# Patient Record
Sex: FEMALE | Race: WHITE | NOT HISPANIC OR LATINO | URBAN - METROPOLITAN AREA
[De-identification: names, ages, dates, MRNs, and addresses within clinical notes are randomized per-mention and may not be internally consistent; named-entity substitution may affect disease eponyms.]

---

## 2024-03-01 ENCOUNTER — APPOINTMENT (OUTPATIENT)
Dept: PHYSICAL THERAPY | Facility: CLINIC | Age: 34
End: 2024-03-01
Payer: COMMERCIAL

## 2024-03-12 ENCOUNTER — TREATMENT (OUTPATIENT)
Dept: PHYSICAL THERAPY | Facility: CLINIC | Age: 34
End: 2024-03-12
Payer: COMMERCIAL

## 2024-03-12 DIAGNOSIS — N81.89 OTHER FEMALE GENITAL PROLAPSE: ICD-10-CM

## 2024-03-12 PROCEDURE — 97162 PT EVAL MOD COMPLEX 30 MIN: CPT | Mod: GP | Performed by: PHYSICAL THERAPIST

## 2024-03-12 PROCEDURE — 97112 NEUROMUSCULAR REEDUCATION: CPT | Mod: GP | Performed by: PHYSICAL THERAPIST

## 2024-03-12 PROCEDURE — 97110 THERAPEUTIC EXERCISES: CPT | Mod: GP | Performed by: PHYSICAL THERAPIST

## 2024-03-12 ASSESSMENT — PAIN - FUNCTIONAL ASSESSMENT: PAIN_FUNCTIONAL_ASSESSMENT: 0-10

## 2024-03-12 ASSESSMENT — PAIN SCALES - GENERAL: PAINLEVEL_OUTOF10: 4

## 2024-03-12 NOTE — PROGRESS NOTES
"               Physical Therapy Pelvic Floor Evaluation    Patient Name: Stephanie Hall  MRN: [id number]  Today's Date: 3/13/2024  Time Calculation  Start Time: 1705  Stop Time: 1805  Time Calculation (min): 60 min  PT Therapeutic Procedures Time Entry  Neuromuscular Re-Education Time Entry: 10  Therapeutic Exercise Time Entry: 15     Moderate complexity due to patient's clinical presentation being evolving with changing characteristics, with comorbidities all of which may negatively impact rehab tolerance and progression.      Current Problem:   1. Other female genital prolapse  Referral to Physical Therapy    Follow Up In Physical Therapy          Subjective    Precautions:  Precautions  Precautions Comment: no precautions -- 8 weeks post delivery  Pain:  Pain Assessment  Pain Assessment: 0-10  Pain Score: 4 (pain varies 0-4)    PELVIC HISTORY:  Chief Complaint/Description of Symptoms:   3 pregnancies in 4 years.  Last pregnancy in January.  Last pregnancy was very difficult -- first degree tear, but first pregnancy had a  \"pretty big tear.\";  @ 8 weak appointment had mild prolapse; sometimes feels prolapse;  Reporting that her back, pelvic area, and hips throb during an active day.  Admits a little incontinece -- feels like a little trickle with walking but isn't consistent.  LBP isn't constant ; + breast feeding;  Reports she felt pelvic pain throughout pregnancy and attended 2 PT sessions without benefit.      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,    Patient Primary Goal:   To prevent prolapse from worsening and just feel better    PELVIC PAIN:     Location: back hips, pubis aching discomfort  Description: discomfort -- 4/10, sometimes 0/10 first thing in moring  Aggravating factors: prolonged walking or ADLs  Mitigating factors:Laying down   Pain with intercourse Deep penetration -- discomfort, The entire next day though-- throbbing in addcutors    MENSTRUAL HISTORY:  Not on " menstrual cycle yet:    OB HISTORY:  3 Vaginal deliveries  BLADDER:     Daytime Voiding Frequency: doesn't think she is going too often but thinks every 2 hours  Nighttime Voiding Frequency:  2 times a 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   Excessive Urinary Urgency: no  Unintentional urine loss: yes   Leakage occurs with: walking or activity   Leakage amount: small amount   Slow/weak urine stream: normal  Difficulty initiating urine stream/push to urinate: NO  Able to completely empty bladder: did feel a few times not emptying bladder -- occasional    BOWEL:     Denies bowel symptoms but admits to occasional constipation    FLUID/DIET INTAKE:  Overall good diet and 48-64 ounces of water per day    EXERCISE:  Current exercise regime:   A little walking,     Objective   POSTURE/ALIGNMENT:  Left anterior rotation, + pubic shear more pain on left than right, increase in lumbar lordosis     ROM:  Lumbar ROM Range   Flexion 75%   Extension 75%     Hip AROM L R   Active HIP ROM -- all planes WNL WNL      MMT:  Hip MMT L R   Hip Flexion 4/5 4/5   Hip Extension 3-/5 3-/5   Hip Abduction 4-/5 4-/5   External Rotation 4-/5 4-/5   Internal Rotation 4-/5 4-/5      TA: fair minus TA activation, poor load transfer with active SLR, tends to breath with upper chest instead of diaphragmatic/360 breathing    FLEXIBILITY R/L/and EXTERNAL MUSCLE PALPATION:  Hamstrings: tight/tight  Bilateral tight adductors especially proximal -- very tender to palpation  Tight piriformis bilaterally     PELVIC FLOOR  Patient Position:  hooklying  EXTERNAL OBSERVATION:  Voluntary Contraction: able to contract   Voluntary Relaxation: able to relax   Vulvar Assessment: health tissue observed    EXTERNAL PALPATION:  Bulbo: soreness bilateral   Ischiocavernosus: soreness bilateral   Transverse perineum: tender left     INTERNAL VAGINAL EXAMINATION WITH PATIENT CONSENT:  Internal:  Superficial layer mm   Mild tenderness at vaginal entrance bilaterally    Deep  layer:  Tender right >Left OI and ilio and pubococcygeus.  Patient felt radiating symptoms with palpation of this muscles to back and hip area  Tenderness R>L, facial expression--grimaces throughout    Strength  PF contraction 2/5, with exhale 2+/5; challenged with hold pattern    Prolapse  + cystocele and rectocele grade 1-2 noted in hooklying may be more in standing or sitting with gravity    Outcome Measures:  PFIQ-7    Treatments:  Neuro-reeducation:    Educated on diaphragmatic breathing and relation to pelvic floor  Advised to exhale on return from squatted positions when picks up baby and cleans up toys from house  Discussed importance of drinking of water for constipation and avoiding straining with bowel movements with relation to prolapse.  Therapeutic exercise:    Kegel with breathing and proper technique for kegel     OP EDUCATION:  Outpatient Education  Individual(s) Educated: Patient  Education Provided: Anatomy, Home Exercise Program, Physiology, POC  Risk and Benefits Discussed with Patient/Caregiver/Other: yes  Patient/Caregiver Demonstrated Understanding: yes  Plan of Care Discussed and Agreed Upon: yes  Patient Response to Education: Patient/Caregiver Verbalized Understanding of Information, Patient/Caregiver Performed Return Demonstration of Exercises/Activities, Patient/Caregiver Asked Appropriate Questions    Assessment     PT Assessment Results: Decreased strength, Decreased coordination, Pain, Decreased range of motion  Rehab Prognosis: Good  Evaluation/Treatment Tolerance: Patient tolerated treatment well    Pt is a 33 y.o. female who presents post 3rd vaginal delivery with impairments of weakness, pain, and tightness. These impairments have led to functional limitations including pain with intercourse, pain with ADLs, mild urinary incontinence. Pt would benefit from skilled physical therapy intervention to improve above impairments and facilitate return to  function.    Plan:  Treatment/Interventions: Dry needling, Education/ Instruction, Electrical stimulation, Manual therapy, Neuromuscular re-education, Therapeutic activities, Therapeutic exercises, Taping techniques, Ultrasound, Biofeedback  PT Plan: Skilled PT  PT Frequency: 1 time per week  Duration: 12 weeks  Number of Treatments Authorized: 10 sessions per POC, Medical necessity per insurance  Rehab Potential: Good  Plan of Care Agreement: Patient  Next session:  correct alignment, consider SI belt;  roller for adductors, bridging on slant    Goals:  Active       PT Problem       STGs       Start:  03/12/24    Expected End:  05/11/24       1)  Patient will be knowledgeable with regards to downtraining techniques to improve relaxation of pelvic floor muscles  2)  Patient will report 25% less pain/symptoms during walking  3)  Patient will perform diaphragmatic breathing properly to relax and contract pelvic floor muscle appropriately  4)  Patient will demonstrate good transverse abdominis activation to stabilize lumbopelvic girdle during ADLs                    LTGs       Start:  03/12/24    Expected End:  07/10/24       1)  Patient will increase tolerance to internal penetration for examination with min to nil pain  2)  Patient will reduce urine loss to nil during ADLs  3)  Patient will be able to return to prior level of function without feeling prolapse  4)  Patient will improve pelvic floor contraction to by 1/2-1 MMT  with coordinated exhale for improved continence   5)  Patient will report no pain in back and legs post ADLs, intercourse and walking           Patient Stated Goal 1       Start:  03/12/24    Expected End:  07/10/24       Patient wants to increase pelvic floor strength to improve prolapse

## 2024-03-27 ENCOUNTER — APPOINTMENT (OUTPATIENT)
Dept: PHYSICAL THERAPY | Facility: CLINIC | Age: 34
End: 2024-03-27
Payer: COMMERCIAL

## 2024-04-05 ENCOUNTER — TREATMENT (OUTPATIENT)
Dept: PHYSICAL THERAPY | Facility: CLINIC | Age: 34
End: 2024-04-05
Payer: COMMERCIAL

## 2024-04-05 DIAGNOSIS — N81.89 OTHER FEMALE GENITAL PROLAPSE: ICD-10-CM

## 2024-04-05 PROCEDURE — 97110 THERAPEUTIC EXERCISES: CPT | Mod: GP | Performed by: PHYSICAL THERAPIST

## 2024-04-05 PROCEDURE — 97140 MANUAL THERAPY 1/> REGIONS: CPT | Mod: GP | Performed by: PHYSICAL THERAPIST

## 2024-04-05 ASSESSMENT — PAIN DESCRIPTION - DESCRIPTORS: DESCRIPTORS: SORE

## 2024-04-05 ASSESSMENT — PAIN SCALES - GENERAL: PAINLEVEL_OUTOF10: 3

## 2024-04-05 ASSESSMENT — PAIN - FUNCTIONAL ASSESSMENT: PAIN_FUNCTIONAL_ASSESSMENT: 0-10

## 2024-04-05 NOTE — PROGRESS NOTES
Physical Therapy Treatment    Patient Name: Stephanie Hall  MRN: [id number]  Encounter date:  2024  Time Calculation  Start Time: 1310  Stop Time: 1405  Time Calculation (min): 55 min     PT Therapeutic Procedures Time Entry  Manual Therapy Time Entry: 18  Therapeutic Exercise Time Entry: 35    Visit Number:  2 (including evaluation)  Planned total visits: 10 per POC  Visits Authorized/Insurance Coverage:  [employer] [employer], 80/20 [place], [place], NO AUTH, [employer] # [id number]    Current Problem  1. Other female genital prolapse  Follow Up In Physical Therapy        Precautions  Precautions  Precautions Comment: no precautions -- vaginal delivery 24  Post  24    Pain  Pain Assessment: 0-10  Pain Score: 3  Pain Descriptors: Sore  Still has been having all over pains especially by end of night.    Subjective  General  Reason for Referral: prolapse  Referred By: Dr. Stokes     Overall feels weak all over, but no increase in symptoms since last session.      Objective  Fair (-) TA activation, left anterior rotation    Treatment:  Manual:    MET to correction pelvic alignment  Therapeutic exercise:    Isometric hip adduction with pillow or ball x 5   Isometric hip flexion (R) and hip extension left x 5   Hip abduction blue x 10 to tension  TA activation  TA activation with UE flexion x 10   Side pull with blue band x 10   (Cues for proper breathing)     Therapeutic activity:   Discussed proper breathing mechanics with ADLs and exhaling on exertion, how to put car seat in car,  baby and toys     Current HEP:  Breathing and kegel  Access Code: [email or web address]  URL: [email or web address]  Date: 2024  Prepared by: Gisel Ding    Exercises  - Supine Hip adduction isometric with Ball -- Exercise 1   - 1 x daily - 7 x weekly - 1 sets - 5 reps - 5 hold  - Hooklying Clamshell with Resistance -- Exercise 4   - 1 x daily - 7 x weekly - 1 sets - 5-10 reps  - Hooklying SI Joint  Self-Correction  - 1 x daily - 7 x weekly - 3 sets - 10 reps  - Supine Bridge  - 1 x daily - 7 x weekly - 1 sets - 10 reps  - Hooklying Transversus Abdominis Palpation  - 1 x daily - 7 x weekly - 1 sets - 10 reps  - Dead Bug Alternating Arm Extension  - 1 x daily - 7 x weekly - 1 sets - 10 reps    OP EDUCATION:  Outpatient Education  Individual(s) Educated: Patient  Education Provided: Anatomy, Home Exercise Program, Physiology, POC  Risk and Benefits Discussed with Patient/Caregiver/Other: yes  Patient/Caregiver Demonstrated Understanding: yes  Plan of Care Discussed and Agreed Upon: yes  Patient Response to Education: Patient/Caregiver Verbalized Understanding of Information, Patient/Caregiver Performed Return Demonstration of Exercises/Activities, Patient/Caregiver Asked Appropriate Questions    Assessment:  PT Assessment  PT Assessment Results: Decreased strength, Decreased coordination, Pain, Decreased range of motion  Rehab Prognosis: Good  Evaluation/Treatment Tolerance: Patient tolerated treatment well  Pt's response to treatment:  Able to align pelvis with MET.  Patient challenged with core activation.  Patient advised to perform exercises on slant to allow for gravity to assist in prolapse reduction.  Patient continues to benefit from skilled PT to strengthen and progress towards established physical therapy goals.     Pain end of session: muscle soreness    Plan:  OP PT Plan  Treatment/Interventions: Dry needling, Education/ Instruction, Electrical stimulation, Manual therapy, Neuromuscular re-education, Therapeutic activities, Therapeutic exercises, Taping techniques, Ultrasound, Biofeedback  PT Plan: Skilled PT  PT Frequency: 1 time per week  Duration: 12 weeks  Number of Treatments Authorized: 10 sessions per POC, Medical necessity per insurance  Rehab Potential: Good  Plan of Care Agreement: Patient  Continue with current POC/no changes    Assessment of current progress against goals:  Progressing  toward functional goals    Goals:  Active       PT Problem       STGs       Start:  03/12/24    Expected End:  05/11/24       1)  Patient will be knowledgeable with regards to downtraining techniques to improve relaxation of pelvic floor muscles  2)  Patient will report 25% less pain/symptoms during walking  3)  Patient will perform diaphragmatic breathing properly to relax and contract pelvic floor muscle appropriately  4)  Patient will demonstrate good transverse abdominis activation to stabilize lumbopelvic girdle during ADLs                    LTGs       Start:  03/12/24    Expected End:  07/10/24       1)  Patient will increase tolerance to internal penetration for examination with min to nil pain  2)  Patient will reduce urine loss to nil during ADLs  3)  Patient will be able to return to prior level of function without feeling prolapse  4)  Patient will improve pelvic floor contraction to by 1/2-1 MMT  with coordinated exhale for improved continence   5)  Patient will report no pain in back and legs post ADLs, intercourse and walking           Patient Stated Goal 1       Start:  03/12/24    Expected End:  07/10/24       Patient wants to increase pelvic floor strength to improve prolapse

## 2024-04-09 ENCOUNTER — APPOINTMENT (OUTPATIENT)
Dept: PHYSICAL THERAPY | Facility: CLINIC | Age: 34
End: 2024-04-09
Payer: COMMERCIAL

## 2024-04-11 ENCOUNTER — APPOINTMENT (OUTPATIENT)
Dept: PHYSICAL THERAPY | Facility: CLINIC | Age: 34
End: 2024-04-11
Payer: COMMERCIAL

## 2024-04-16 ENCOUNTER — APPOINTMENT (OUTPATIENT)
Dept: PHYSICAL THERAPY | Facility: CLINIC | Age: 34
End: 2024-04-16
Payer: COMMERCIAL

## 2024-04-18 ENCOUNTER — APPOINTMENT (OUTPATIENT)
Dept: PHYSICAL THERAPY | Facility: CLINIC | Age: 34
End: 2024-04-18
Payer: COMMERCIAL

## 2024-04-23 ENCOUNTER — TREATMENT (OUTPATIENT)
Dept: PHYSICAL THERAPY | Facility: CLINIC | Age: 34
End: 2024-04-23
Payer: COMMERCIAL

## 2024-04-23 DIAGNOSIS — N81.89 OTHER FEMALE GENITAL PROLAPSE: ICD-10-CM

## 2024-04-23 PROCEDURE — 97530 THERAPEUTIC ACTIVITIES: CPT | Mod: GP | Performed by: PHYSICAL THERAPIST

## 2024-04-23 PROCEDURE — 97112 NEUROMUSCULAR REEDUCATION: CPT | Mod: GP | Performed by: PHYSICAL THERAPIST

## 2024-04-23 PROCEDURE — 97110 THERAPEUTIC EXERCISES: CPT | Mod: GP | Performed by: PHYSICAL THERAPIST

## 2024-04-23 PROCEDURE — 97140 MANUAL THERAPY 1/> REGIONS: CPT | Mod: GP | Performed by: PHYSICAL THERAPIST

## 2024-04-23 NOTE — PROGRESS NOTES
Physical Therapy Treatment    Patient Name: Stephanie Hall  MRN: [id number]  Encounter date:  4/23/2024  Time Calculation  Start Time: 1700  Stop Time: 1800  Time Calculation (min): 60 min     PT Therapeutic Procedures Time Entry  Manual Therapy Time Entry: 20  Neuromuscular Re-Education Time Entry: 10  Therapeutic Exercise Time Entry: 20  Therapeutic Activity Time Entry: 10    Visit Number:  3 (including evaluation)  Planned total visits: 10 per POC  Visits Authorized/Insurance Coverage:  [employer] [employer], 80/20 [place], [place], NO AUTH, [employer] # [id number]    Current Problem  Problem List Items Addressed This Visit             ICD-10-CM    Other female genital prolapse N81.89         Precautions  Precautions  Precautions Comment: no precautions -- vaginal delivery 1/14/24    Pain     Back is hurting 3-4/10.  (R) side lower  Subjective  General  Reason for Referral: prolapse  Referred By: Dr. Stokes     Reports she felt something hard protruding from vagina -- felt like bone.  Hasn't done many exercises because family has been ill.      Objective  Enlarged glands vaginal entrance.  Firm but not painful for patient;  tender lumbar paraspinals and quadratus R>L;   Tender L>R iliococcygeus, pubococcygeus and obturator internus    Treatment:  Manual:  Verbal consent to vaginal examination and treatment  Sweeping to vaginal entrance bilateral, STM to pelvic floor muscles bilaterally L>R with trigger point release  In prone over pillow:    STM to lumbar paraspinals with free up  Therapeutic exercise  Neuro Re-education:    Reviewed proper pelvic floor contraction with coordination of breathing.  Patient needed cues for breathing with exercises for for strengthening and stretching;    Therapeutic exercise:    Discontinued bridging as this increased back pain  Clamshells x 10 L1 bilateral  PPT x 10   Hip adduction with pillow x 10   Stretching red ball but showed modification for no ball at  home  Piriformis stretching 10 sec x 3 bilateral   DKTC x 5   Happy baby with ball support 10 sec x 5   Therapeutic activity:    Discussed proper lifting mechanics with 10# medicine ball to simulate car seat lift and carry, stressed avoid twisting will placing car seat in car or removing.       Current HEP: (same access code is updated)  Access Code: [email or web address]  URL: [email or web address]  Date: 04/23/2024  Prepared by: Gisel Ding    Exercises  - Supine Hip adduction isometric with Ball -- Exercise 1   - 1 x daily - 7 x weekly - 1 sets - 5 reps - 5 hold  - Hooklying Clamshell with Resistance -- Exercise 4   - 1 x daily - 7 x weekly - 1 sets - 5-10 reps  - Hooklying SI Joint Self-Correction  - 1 x daily - 7 x weekly - 3 sets - 10 reps  - Supine Bridge  - 1 x daily - 7 x weekly - 1 sets - 10 reps  - Hooklying Transversus Abdominis Palpation  - 1 x daily - 7 x weekly - 1 sets - 10 reps  - Dead Bug Alternating Arm Extension  - 1 x daily - 7 x weekly - 1 sets - 10 reps  - Clamshell  - 1 x daily - 7 x weekly - 1 sets - 10 reps  - Supine Posterior Pelvic Tilt  - 1 x daily - 7 x weekly - 1 sets - 10 reps  - Supine Pelvic Floor Stretch  - 1 x daily - 7 x weekly - 1 sets - 5 reps - 10 sec hold  - Supine Piriformis Stretch  - 1 x daily - 7 x weekly - 1 sets - 3 reps - 10sec hold  - Supine Hip and Knee Flexion AROM with Swiss Ball  - 1 x daily - 7 x weekly - 1 sets - 10 reps    OP EDUCATION:  Outpatient Education  Individual(s) Educated: Patient  Education Provided: Anatomy, Home Exercise Program, Physiology, POC  Risk and Benefits Discussed with Patient/Caregiver/Other: yes  Patient/Caregiver Demonstrated Understanding: yes  Plan of Care Discussed and Agreed Upon: yes  Patient Response to Education: Patient/Caregiver Verbalized Understanding of Information, Patient/Caregiver Performed Return Demonstration of Exercises/Activities, Patient/Caregiver Asked Appropriate Questions    Assessment:  PT Assessment  PT  Assessment Results: Decreased strength, Decreased coordination, Pain, Decreased range of motion  Rehab Prognosis: Good  Pt's response to treatment:  No palpable hard structure from vagina this date.  Questionable if patient felt firm gland.  Patient will continue to monitor.  Patient with poor transverse abdominis activation causing spinal compensation during body mechanics while carrying car seat etc.  Patient had less pain after session and improved coordination of breathing and understanding of body mechanics    Pain end of session: 1-2/10    Plan:  OP PT Plan  Treatment/Interventions: Dry needling, Education/ Instruction, Electrical stimulation, Manual therapy, Neuromuscular re-education, Therapeutic activities, Therapeutic exercises, Taping techniques, Ultrasound, Biofeedback  PT Plan: Skilled PT  PT Frequency: 1 time per week  Duration: 12 weeks  Number of Treatments Authorized: 10 sessions per POC, Medical necessity per insurance  Rehab Potential: Good  Plan of Care Agreement: Patient  Continue with current POC/no changes    Assessment of current progress against goals:  Progressing toward functional goals    Goals:  Active       PT Problem       STGs       Start:  03/12/24    Expected End:  05/11/24       1)  Patient will be knowledgeable with regards to downtraining techniques to improve relaxation of pelvic floor muscles  2)  Patient will report 25% less pain/symptoms during walking  3)  Patient will perform diaphragmatic breathing properly to relax and contract pelvic floor muscle appropriately  4)  Patient will demonstrate good transverse abdominis activation to stabilize lumbopelvic girdle during ADLs                    LTGs       Start:  03/12/24    Expected End:  07/10/24       1)  Patient will increase tolerance to internal penetration for examination with min to nil pain  2)  Patient will reduce urine loss to nil during ADLs  3)  Patient will be able to return to prior level of function without  feeling prolapse  4)  Patient will improve pelvic floor contraction to by 1/2-1 MMT  with coordinated exhale for improved continence   5)  Patient will report no pain in back and legs post ADLs, intercourse and walking           Patient Stated Goal 1       Start:  03/12/24    Expected End:  07/10/24       Patient wants to increase pelvic floor strength to improve prolapse

## 2024-04-26 ENCOUNTER — OFFICE VISIT (OUTPATIENT)
Dept: PRIMARY CARE | Facility: CLINIC | Age: 34
End: 2024-04-26
Payer: COMMERCIAL

## 2024-04-26 ENCOUNTER — LAB (OUTPATIENT)
Dept: LAB | Facility: LAB | Age: 34
End: 2024-04-26
Payer: COMMERCIAL

## 2024-04-26 VITALS
BODY MASS INDEX: 22.16 KG/M2 | SYSTOLIC BLOOD PRESSURE: 102 MMHG | WEIGHT: 133 LBS | HEIGHT: 65 IN | DIASTOLIC BLOOD PRESSURE: 54 MMHG

## 2024-04-26 DIAGNOSIS — H57.13 PAIN OF BOTH EYES: ICD-10-CM

## 2024-04-26 DIAGNOSIS — M25.50 ARTHRALGIA, UNSPECIFIED JOINT: ICD-10-CM

## 2024-04-26 DIAGNOSIS — M79.10 MYALGIA: ICD-10-CM

## 2024-04-26 DIAGNOSIS — R14.0 ABDOMINAL BLOATING: Primary | ICD-10-CM

## 2024-04-26 PROBLEM — J06.9 RECURRENT UPPER RESPIRATORY TRACT INFECTION: Status: ACTIVE | Noted: 2024-04-26

## 2024-04-26 PROBLEM — D72.9 NEUTROPHILIA: Status: ACTIVE | Noted: 2024-04-26

## 2024-04-26 PROBLEM — O46.91: Status: ACTIVE | Noted: 2023-07-14

## 2024-04-26 PROBLEM — D70.4 CYCLIC NEUTROPENIA (MULTI): Status: ACTIVE | Noted: 2024-04-26

## 2024-04-26 PROBLEM — O09.299: Status: ACTIVE | Noted: 2024-01-04

## 2024-04-26 PROBLEM — D89.9: Status: ACTIVE | Noted: 2024-04-26

## 2024-04-26 PROBLEM — D72.19 OTHER EOSINOPHILIA: Status: ACTIVE | Noted: 2024-04-26

## 2024-04-26 PROBLEM — R76.8 RED BLOOD CELL ANTIBODY POSITIVE: Status: ACTIVE | Noted: 2024-01-16

## 2024-04-26 PROBLEM — J32.9 RECURRENT SINUSITIS: Status: ACTIVE | Noted: 2024-04-26

## 2024-04-26 PROBLEM — I89.9 LYMPH NODE DISORDER: Status: ACTIVE | Noted: 2024-04-26

## 2024-04-26 PROBLEM — J35.1 CHRONIC TONSILLAR HYPERTROPHY: Status: ACTIVE | Noted: 2024-04-26

## 2024-04-26 PROBLEM — O99.019 ANTEPARTUM ANEMIA (HHS-HCC): Status: ACTIVE | Noted: 2024-01-15

## 2024-04-26 PROCEDURE — 86431 RHEUMATOID FACTOR QUANT: CPT

## 2024-04-26 PROCEDURE — 80053 COMPREHEN METABOLIC PANEL: CPT

## 2024-04-26 PROCEDURE — 85027 COMPLETE CBC AUTOMATED: CPT

## 2024-04-26 PROCEDURE — 99204 OFFICE O/P NEW MOD 45 MIN: CPT | Performed by: INTERNAL MEDICINE

## 2024-04-26 PROCEDURE — 86038 ANTINUCLEAR ANTIBODIES: CPT

## 2024-04-26 PROCEDURE — 86140 C-REACTIVE PROTEIN: CPT

## 2024-04-26 PROCEDURE — 83735 ASSAY OF MAGNESIUM: CPT

## 2024-04-26 PROCEDURE — 85652 RBC SED RATE AUTOMATED: CPT

## 2024-04-26 PROCEDURE — 36415 COLL VENOUS BLD VENIPUNCTURE: CPT

## 2024-04-26 NOTE — PROGRESS NOTES
"Subjective   Patient ID: Stephanie Hall is a 34 y.o. female who presents for New Patient Visit.    HPI   To establish PCP  She is 3 months postpartum but having a lot of issues with body ache muscle pains headaches.  She is also having GI symptoms of bloating abdominal discomfort.  End of February she got pinkeye she was exposed to adenovirus and wondering if still that is causing symptoms in her system    Past medical history: Ovarian cyst removed 5 years ago  Social history: Non-smoker nondrinker no drugs  Occupation: Stay home mom  Family history: Grandfather heavy smoker had cancer  Review of Systems    Objective   /54   Ht 1.651 m (5' 5\")   Wt 60.3 kg (133 lb)   BMI 22.13 kg/m²     Physical Exam  Vitals reviewed.   Constitutional:       Appearance: Normal appearance.   HENT:      Head: Normocephalic and atraumatic.      Right Ear: Tympanic membrane, ear canal and external ear normal.      Left Ear: Tympanic membrane, ear canal and external ear normal.      Nose: Nose normal.      Mouth/Throat:      Pharynx: Oropharynx is clear.   Eyes:      Extraocular Movements: Extraocular movements intact.      Conjunctiva/sclera: Conjunctivae normal.      Pupils: Pupils are equal, round, and reactive to light.   Cardiovascular:      Rate and Rhythm: Normal rate and regular rhythm.      Pulses: Normal pulses.      Heart sounds: Normal heart sounds.   Pulmonary:      Effort: Pulmonary effort is normal.      Breath sounds: Normal breath sounds.   Abdominal:      General: Abdomen is flat. Bowel sounds are normal.      Palpations: Abdomen is soft.   Musculoskeletal:      Cervical back: Normal range of motion and neck supple.   Skin:     General: Skin is warm and dry.   Neurological:      General: No focal deficit present.      Mental Status: She is alert and oriented to person, place, and time.   Psychiatric:         Mood and Affect: Mood normal.         Assessment/Plan   Problem List Items Addressed This Visit  "   None  Visit Diagnoses         Codes    Abdominal bloating    -  Primary R14.0    Arthralgia, unspecified joint     M25.50    Relevant Orders    Sedimentation Rate (Completed)    CBC (Completed)    Comprehensive Metabolic Panel (Completed)    Magnesium (Completed)    Rheumatoid factor (Completed)    SEPIDEH with Reflex to PATRICIA (Completed)    C-Reactive Protein (Completed)    Pain of both eyes     H57.13    Relevant Orders    Referral to Ophthalmology    Myalgia     M79.10          Patient's past blood work is all been in normal range  Will do sed rate C-reactive protein SEPIDEH titer rheumatoid factor to rule out any connective tissue disorder  Will check CBC CMP to rule out any electrolyte imbalance or anemia  Patient also wants to be checked for celiac  Follow-up after the test

## 2024-04-27 LAB
ALBUMIN SERPL BCP-MCNC: 4.6 G/DL (ref 3.4–5)
ALP SERPL-CCNC: 75 U/L (ref 33–110)
ALT SERPL W P-5'-P-CCNC: 45 U/L (ref 7–45)
ANION GAP SERPL CALC-SCNC: 11 MMOL/L (ref 10–20)
AST SERPL W P-5'-P-CCNC: 26 U/L (ref 9–39)
BILIRUB SERPL-MCNC: 0.3 MG/DL (ref 0–1.2)
BUN SERPL-MCNC: 22 MG/DL (ref 6–23)
CALCIUM SERPL-MCNC: 9.3 MG/DL (ref 8.6–10.6)
CHLORIDE SERPL-SCNC: 104 MMOL/L (ref 98–107)
CO2 SERPL-SCNC: 29 MMOL/L (ref 21–32)
CREAT SERPL-MCNC: 0.65 MG/DL (ref 0.5–1.05)
CRP SERPL-MCNC: 0.38 MG/DL
EGFRCR SERPLBLD CKD-EPI 2021: >90 ML/MIN/1.73M*2
ERYTHROCYTE [DISTWIDTH] IN BLOOD BY AUTOMATED COUNT: 15.5 % (ref 11.5–14.5)
ERYTHROCYTE [SEDIMENTATION RATE] IN BLOOD BY WESTERGREN METHOD: 3 MM/H (ref 0–20)
GLUCOSE SERPL-MCNC: 80 MG/DL (ref 74–99)
HCT VFR BLD AUTO: 36.6 % (ref 36–46)
HGB BLD-MCNC: 12.5 G/DL (ref 12–16)
MAGNESIUM SERPL-MCNC: 2.05 MG/DL (ref 1.6–2.4)
MCH RBC QN AUTO: 27.8 PG (ref 26–34)
MCHC RBC AUTO-ENTMCNC: 34.2 G/DL (ref 32–36)
MCV RBC AUTO: 82 FL (ref 80–100)
NRBC BLD-RTO: 0 /100 WBCS (ref 0–0)
PLATELET # BLD AUTO: 297 X10*3/UL (ref 150–450)
POTASSIUM SERPL-SCNC: 3.8 MMOL/L (ref 3.5–5.3)
PROT SERPL-MCNC: 7.1 G/DL (ref 6.4–8.2)
RBC # BLD AUTO: 4.49 X10*6/UL (ref 4–5.2)
RHEUMATOID FACT SER NEPH-ACNC: <10 IU/ML (ref 0–15)
SODIUM SERPL-SCNC: 140 MMOL/L (ref 136–145)
WBC # BLD AUTO: 6.7 X10*3/UL (ref 4.4–11.3)

## 2024-04-29 DIAGNOSIS — T78.40XA ALLERGY, INITIAL ENCOUNTER: ICD-10-CM

## 2024-04-29 LAB — ANA SER QL HEP2 SUBST: NEGATIVE

## 2024-04-30 ENCOUNTER — APPOINTMENT (OUTPATIENT)
Dept: PHYSICAL THERAPY | Facility: CLINIC | Age: 34
End: 2024-04-30
Payer: COMMERCIAL

## 2024-05-02 ENCOUNTER — APPOINTMENT (OUTPATIENT)
Dept: PHYSICAL THERAPY | Facility: CLINIC | Age: 34
End: 2024-05-02
Payer: COMMERCIAL

## 2024-05-07 ENCOUNTER — TREATMENT (OUTPATIENT)
Dept: PHYSICAL THERAPY | Facility: CLINIC | Age: 34
End: 2024-05-07
Payer: COMMERCIAL

## 2024-05-07 DIAGNOSIS — N81.89 OTHER FEMALE GENITAL PROLAPSE: ICD-10-CM

## 2024-05-07 PROCEDURE — 97112 NEUROMUSCULAR REEDUCATION: CPT | Mod: GP | Performed by: PHYSICAL THERAPIST

## 2024-05-07 PROCEDURE — 97110 THERAPEUTIC EXERCISES: CPT | Mod: GP | Performed by: PHYSICAL THERAPIST

## 2024-05-07 NOTE — PROGRESS NOTES
Physical Therapy Treatment    Patient Name: Stephanie Hall  MRN: [id number]  Encounter date:  5/7/2024  Time Calculation  Start Time: 1700  Stop Time: 1800  Time Calculation (min): 60 min     PT Therapeutic Procedures Time Entry  Neuromuscular Re-Education Time Entry: 15  Therapeutic Exercise Time Entry: 40    Visit Number:  4 (including evaluation)  Planned total visits: 10 per POC  Visits Authorized/Insurance Coverage:  [employer] [employer], 80/20 [place], [place], NO AUTH, [employer] TRANS # [id number]    Current Problem  Problem List Items Addressed This Visit             ICD-10-CM    Other female genital prolapse N81.89       Precautions  Precautions  Precautions Comment: no precautions -- vaginal delivery 1/14/24    Pain     Back is feeling better.  Having random lighting jolt of pelvic pain since having coughing episodes 0-7/10)    Subjective  General  Reason for Referral: prolapse  Referred By: Dr. Stokes     Feels like she has been having more incontinence since coughing so much from illness/allergy;  Feels like she has inflammation all over    Objective  Tightness in piriformis, weak TA activation    Treatment:  Neuro-Re-education:    Discussed coordinating TA brace in sitting, lying or standing when coughing or sneezing to support Pelvic floor and prevent incontinence.    Therapeutic exercise:  cues for proper breathing  Supine piriformis stretch with ball x 10 sec x 3   Happy baby stretch 10 sec x 3 with ball   LTR x 10 with ball   LTR without ball x 10 wide   Clamshells X 5 L1, x 5 L2 bilaterally   TA activation with heel slides x 10   TA activation in standing with shoulder extension x 10 green band  Heel raises x 10   Marching x 10      Current HEP:  Access Code: [email or web address]  URL: [email or web address]  Date: 05/07/2024  Prepared by: Gisel Ding    Exercises  - Shoulder extension with resistance - Neutral  - 1 x daily - 7 x weekly - 1 sets - 10 reps  - Standing Hip Flexion March  - 1 x  daily - 7 x weekly - 1 sets - 10 reps  - Heel Raises with Counter Support  - 1 x daily - 7 x weekly - 1 sets - 10 reps  Access Code: [email or web address]  URL: [email or web address]  Date: 04/23/2024  Prepared by: Gisel Ding     Exercises  - Supine Hip adduction isometric with Ball -- Exercise 1   - 1 x daily - 7 x weekly - 1 sets - 5 reps - 5 hold  - Hooklying Clamshell with Resistance -- Exercise 4   - 1 x daily - 7 x weekly - 1 sets - 5-10 reps  - Hooklying SI Joint Self-Correction  - 1 x daily - 7 x weekly - 3 sets - 10 reps  - Supine Bridge  - 1 x daily - 7 x weekly - 1 sets - 10 reps  - Hooklying Transversus Abdominis Palpation  - 1 x daily - 7 x weekly - 1 sets - 10 reps  - Dead Bug Alternating Arm Extension  - 1 x daily - 7 x weekly - 1 sets - 10 reps  - Clamshell  - 1 x daily - 7 x weekly - 1 sets - 10 reps  - Supine Posterior Pelvic Tilt  - 1 x daily - 7 x weekly - 1 sets - 10 reps  - Supine Pelvic Floor Stretch  - 1 x daily - 7 x weekly - 1 sets - 5 reps - 10 sec hold  - Supine Piriformis Stretch  - 1 x daily - 7 x weekly - 1 sets - 3 reps - 10sec hold  - Supine Hip and Knee Flexion AROM with Swiss Ball  - 1 x daily - 7 x weekly - 1 sets - 10 reps      OP EDUCATION:     Educated on the knack to reduce incontinence when coughing, sneezing  Assessment:     Pt's response to treatment:  patient with feelings of systemic inflammation that she is discussing with physicians.  Patient may find elimination diet helpful to reduce inflammation.  Patient with lightening jolt type of pain since coughing that may be related to straining muscles or muscles being tight (as noted on exam) at last session.  Able to abolish symptoms with stretching.      Pain end of session: 0    Plan:  OP PT Plan  Treatment/Interventions: Dry needling, Education/ Instruction, Electrical stimulation, Manual therapy, Neuromuscular re-education, Therapeutic activities, Therapeutic exercises, Taping techniques, Ultrasound, Biofeedback  PT Plan:  Skilled PT  PT Frequency: 1 time per week  Duration: 12 weeks  Number of Treatments Authorized: 10 sessions per POC, Medical necessity per insurance  Rehab Potential: Good  Plan of Care Agreement: Patient  Continue with current POC/no changes    Assessment of current progress against goals:  Progressing toward functional goals    Goals:  Active       PT Problem       STGs       Start:  03/12/24    Expected End:  05/11/24       1)  Patient will be knowledgeable with regards to downtraining techniques to improve relaxation of pelvic floor muscles  2)  Patient will report 25% less pain/symptoms during walking  3)  Patient will perform diaphragmatic breathing properly to relax and contract pelvic floor muscle appropriately  4)  Patient will demonstrate good transverse abdominis activation to stabilize lumbopelvic girdle during ADLs                    LTGs       Start:  03/12/24    Expected End:  07/10/24       1)  Patient will increase tolerance to internal penetration for examination with min to nil pain  2)  Patient will reduce urine loss to nil during ADLs  3)  Patient will be able to return to prior level of function without feeling prolapse  4)  Patient will improve pelvic floor contraction to by 1/2-1 MMT  with coordinated exhale for improved continence   5)  Patient will report no pain in back and legs post ADLs, intercourse and walking           Patient Stated Goal 1       Start:  03/12/24    Expected End:  07/10/24       Patient wants to increase pelvic floor strength to improve prolapse

## 2024-05-14 ENCOUNTER — APPOINTMENT (OUTPATIENT)
Dept: PHYSICAL THERAPY | Facility: CLINIC | Age: 34
End: 2024-05-14
Payer: COMMERCIAL

## 2024-05-21 ENCOUNTER — TREATMENT (OUTPATIENT)
Dept: PHYSICAL THERAPY | Facility: CLINIC | Age: 34
End: 2024-05-21
Payer: COMMERCIAL

## 2024-05-21 DIAGNOSIS — N81.89 OTHER FEMALE GENITAL PROLAPSE: ICD-10-CM

## 2024-05-21 PROCEDURE — 97110 THERAPEUTIC EXERCISES: CPT | Mod: GP | Performed by: PHYSICAL THERAPIST

## 2024-05-21 NOTE — PROGRESS NOTES
Physical Therapy Treatment    Patient Name: Stephanie Hall  MRN: [id number]  Encounter date:  5/21/2024  Time Calculation  Start Time: 1700  Stop Time: 1800  Time Calculation (min): 60 min     PT Therapeutic Procedures Time Entry  Therapeutic Exercise Time Entry: 55    Visit Number:  5 (including evaluation)  Planned total visits: 10 per POC  Visits Authorized/Insurance Coverage:  [employer] [employer]-MET, 80/20 [place], NO AUTH, [employer]ANS # [id number]    Current Problem  Problem List Items Addressed This Visit             ICD-10-CM    Other female genital prolapse N81.89     Precautions     No precautions, vaginal delivery 1/14/24  Pain     Occasionally having lightning jolt of pain (today), but has been present since last session.  Also reporting back is still hurting ( 0-7/10)    Subjective  General        Finding attending PT is difficult and doing exercises.  Feels she has too many exercises.  Has been working on breathing    Objective  Fair TA activation    Treatment:  Updated T0 below:    Issued exercises (3-4 in each position) to be done in sitting, on ball, supine, and standing     Current HEP:  Updated to below:    Access Code: [email or web address]  URL: [email or web address]  Date: 05/21/2024  Prepared by: Gisel Ding    Exercises  - Clamshell  - 1 x daily - 7 x weekly - 1 sets - 10 reps  - Supine March  - 1 x daily - 7 x weekly - 1 sets - 10 reps  - Supine Hip adduction isometric with Ball -- Exercise 1   - 1 x daily - 7 x weekly - 1 sets - 10 reps  - Heel Raises with Counter Support  - 1 x daily - 7 x weekly - 1 sets - 10 reps  - Standing Shoulder Flexion to 90 Degrees with Dumbbells  - 1 x daily - 7 x weekly - 1 sets - 10 reps  - Sidestepping  - 1 x daily - 7 x weekly - 1 sets - 10 reps  - Swiss Ball Knee Extension  - 1 x daily - 7 x weekly - 1 sets - 10 reps  - Alternating Shoulder Flexion Seated on Swiss Ball  - 1 x daily - 7 x weekly - 1 sets - 10 reps  - Swiss Ball March  - 1 x daily - 7 x  weekly - 1 sets - 10 reps  - Seated Swiss Ball Pelvic Circles  - 1 x daily - 7 x weekly - 1 sets - 10 reps  - Supine Hip and Knee Flexion AROM with Swiss Ball  - 1 x daily - 7 x weekly - 1 sets - 10 reps    OP EDUCATION:     Continued to stress importance of breathing as foundation of pelvic floor rehab and preventing prolapse symptoms    Assessment:     Pt's response to treatment:  Patient to be placed on hold to allow patient to focus on updated HEP.  Patient has been educated on proper breathing for pelvic floor relax and activation.  All goals PA at this time.  Well reassess if patient returns to PT.      Pain end of session: 0    Plan:     Hold 45 days.  5 visit remaining    Assessment of current progress against goals:  Progressing toward functional goals    Goals:  Active       PT Problem       STGs       Start:  03/12/24    Expected End:  05/11/24       1)  Patient will be knowledgeable with regards to downtraining techniques to improve relaxation of pelvic floor muscles  2)  Patient will report 25% less pain/symptoms during walking  3)  Patient will perform diaphragmatic breathing properly to relax and contract pelvic floor muscle appropriately  4)  Patient will demonstrate good transverse abdominis activation to stabilize lumbopelvic girdle during ADLs                    LTGs       Start:  03/12/24    Expected End:  07/10/24       1)  Patient will increase tolerance to internal penetration for examination with min to nil pain  2)  Patient will reduce urine loss to nil during ADLs  3)  Patient will be able to return to prior level of function without feeling prolapse  4)  Patient will improve pelvic floor contraction to by 1/2-1 MMT  with coordinated exhale for improved continence   5)  Patient will report no pain in back and legs post ADLs, intercourse and walking           Patient Stated Goal 1       Start:  03/12/24    Expected End:  07/10/24       Patient wants to increase pelvic floor strength to  improve prolapse

## 2024-05-28 ENCOUNTER — APPOINTMENT (OUTPATIENT)
Dept: PHYSICAL THERAPY | Facility: CLINIC | Age: 34
End: 2024-05-28
Payer: COMMERCIAL

## 2024-06-28 ENCOUNTER — DOCUMENTATION (OUTPATIENT)
Dept: PHYSICAL THERAPY | Facility: CLINIC | Age: 34
End: 2024-06-28
Payer: COMMERCIAL

## 2024-06-28 NOTE — PROGRESS NOTES
Physical Therapy    Discharge Summary    Name: Stephanie Hall  MRN: [id number]  : 1990  Date: 24    Discharge Summary: PT    Discharge Information: Date of discharge 24, Date of last visit 24, Date of evaluation 3/12/24, Number of attended visits 5,     Therapy Summary: On last session still having an occasional lighting jolt pain in pelvic floor and back pain.  Finds difficult getting in PT exercises.  Patient requested being placed on hold to practice exercises.  Patient did not return to pelvic floor therapy.  Educated on breathing and lifting to reduce prolapse symptoms    Discharge Status: all goals partially achieved at this time.       Rehab Discharge Reason: Patient did not return to PT post Hold status.

## 2024-07-02 ENCOUNTER — LAB (OUTPATIENT)
Dept: LAB | Facility: LAB | Age: 34
End: 2024-07-02
Payer: COMMERCIAL

## 2024-07-02 ENCOUNTER — OFFICE VISIT (OUTPATIENT)
Dept: PRIMARY CARE | Facility: CLINIC | Age: 34
End: 2024-07-02
Payer: COMMERCIAL

## 2024-07-02 VITALS
BODY MASS INDEX: 20.83 KG/M2 | SYSTOLIC BLOOD PRESSURE: 108 MMHG | HEIGHT: 65 IN | WEIGHT: 125 LBS | DIASTOLIC BLOOD PRESSURE: 58 MMHG

## 2024-07-02 DIAGNOSIS — M25.50 ARTHRALGIA, UNSPECIFIED JOINT: ICD-10-CM

## 2024-07-02 DIAGNOSIS — R06.02 SHORTNESS OF BREATH: Primary | ICD-10-CM

## 2024-07-02 DIAGNOSIS — T78.40XA ALLERGY, INITIAL ENCOUNTER: ICD-10-CM

## 2024-07-02 LAB — ERYTHROCYTE [SEDIMENTATION RATE] IN BLOOD BY WESTERGREN METHOD: 6 MM/H (ref 0–20)

## 2024-07-02 PROCEDURE — 85652 RBC SED RATE AUTOMATED: CPT

## 2024-07-02 PROCEDURE — 86001 ALLERGEN SPECIFIC IGG: CPT

## 2024-07-02 PROCEDURE — 3008F BODY MASS INDEX DOCD: CPT | Performed by: INTERNAL MEDICINE

## 2024-07-02 PROCEDURE — 36415 COLL VENOUS BLD VENIPUNCTURE: CPT

## 2024-07-02 PROCEDURE — 86003 ALLG SPEC IGE CRUDE XTRC EA: CPT

## 2024-07-02 PROCEDURE — 99214 OFFICE O/P EST MOD 30 MIN: CPT | Performed by: INTERNAL MEDICINE

## 2024-07-02 PROCEDURE — 87476 LYME DIS DNA AMP PROBE: CPT

## 2024-07-02 PROCEDURE — 82785 ASSAY OF IGE: CPT

## 2024-07-02 NOTE — PROGRESS NOTES
"Subjective   Patient ID: Stephanie Hall is a 34 y.o. female who presents for Follow-up.    HPI   Patient is here for follow-up  Complaining of follow-up body ache all over muscle joint feels achy   Feeling little winded  n February got conjunctivitis and body ache  Concerned about Lyme's  Eye exam was normal  Complaints of joint pain muscle pains      to establish PCP  She is 3 months postpartum but having a lot of issues with body ache muscle pains headaches.  She is also having GI symptoms of bloating abdominal discomfort.  End of February she got pinkeye she was exposed to adenovirus and wondering if still that is causing symptoms in her system    Past medical history: Ovarian cyst removed 5 years ago  Social history: Non-smoker nondrinker no drugs  Occupation: Stay home mom  Family history: Grandfather heavy smoker had cancer  Review of Systems    Objective   /58   Ht 1.651 m (5' 5\")   Wt 56.7 kg (125 lb)   BMI 20.80 kg/m²     Physical Exam  Vitals reviewed.   Constitutional:       Appearance: Normal appearance.   HENT:      Head: Normocephalic and atraumatic.      Right Ear: Tympanic membrane, ear canal and external ear normal.      Left Ear: Tympanic membrane, ear canal and external ear normal.      Nose: Nose normal.      Mouth/Throat:      Pharynx: Oropharynx is clear.   Eyes:      Extraocular Movements: Extraocular movements intact.      Conjunctiva/sclera: Conjunctivae normal.      Pupils: Pupils are equal, round, and reactive to light.   Cardiovascular:      Rate and Rhythm: Normal rate and regular rhythm.      Pulses: Normal pulses.      Heart sounds: Normal heart sounds.   Pulmonary:      Effort: Pulmonary effort is normal.      Breath sounds: Normal breath sounds.   Abdominal:      General: Abdomen is flat. Bowel sounds are normal.      Palpations: Abdomen is soft.   Musculoskeletal:      Cervical back: Normal range of motion and neck supple.   Skin:     General: Skin is warm and dry. "   Neurological:      General: No focal deficit present.      Mental Status: She is alert and oriented to person, place, and time.   Psychiatric:         Mood and Affect: Mood normal.         Assessment/Plan   Problem List Items Addressed This Visit    None  Visit Diagnoses         Codes    Shortness of breath    -  Primary R06.02    Arthralgia, unspecified joint     M25.50    Relevant Orders    Sedimentation Rate (Completed)    Respiratory Allergy Profile IgE (Completed)    Food Allergy Profile IgE (Completed)    Lyme disease, PCR (Completed)    CMV DNA, Quantitative, PCR (Completed)    Referral to Rheumatology        Past recap    Patient's past blood work is all been in normal range  Will do sed rate C-reactive protein SEPIDEH titer rheumatoid factor to rule out any connective tissue disorder  Will check CBC CMP to rule out any electrolyte imbalance or anemia  Patient also wants to be checked for celiac  Follow-up after the test    7/2/2024  Examination is unremarkable  Workup for connective tissue disorder negative  Will check Lyme's titer  Check CMV EBV titers  Check sed rate  Check allergy testing  Refer to rheumatology as patient is having a lot of joint pain muscle pain

## 2024-07-03 LAB
A ALTERNATA IGE QN: <0.1 KU/L
A FUMIGATUS IGE QN: <0.1 KU/L
BERMUDA GRASS IGE QN: <0.1 KU/L
BOXELDER IGE QN: <0.1 KU/L
C HERBARUM IGE QN: <0.1 KU/L
CALIF WALNUT POLN IGE QN: <0.1 KU/L
CAT DANDER IGE QN: <0.1 KU/L
CLAM IGE QN: <0.1 KU/L
CMN PIGWEED IGE QN: <0.1 KU/L
CMV DNA SERPL NAA+PROBE-LOG IU: NORMAL {LOG_IU}/ML
CODFISH IGE QN: <0.1 KU/L
COMMON RAGWEED IGE QN: 0.22 KU/L
CORN IGE QN: <0.1
COTTONWOOD IGE QN: <0.1 KU/L
D FARINAE IGE QN: 0.28 KU/L
D PTERONYSS IGE QN: 0.38 KU/L
DOG DANDER IGE QN: <0.1 KU/L
EGG WHITE IGE QN: <0.1 KU/L
ENGL PLANTAIN IGE QN: <0.1 KU/L
GLUTEN IGE QN: <0.1 KU/L
GOOSEFOOT IGE QN: <0.1 KU/L
JOHNSON GRASS IGE QN: <0.1 KU/L
KENT BLUE GRASS IGE QN: <0.1 KU/L
LABORATORY COMMENT REPORT: NOT DETECTED
LONDON PLANE IGE QN: <0.1 KU/L
MILK IGE QN: <0.1 KU/L
MT JUNIPER IGE QN: <0.1 KU/L
P NOTATUM IGE QN: <0.1 KU/L
PEANUT IGE QN: <0.1 KU/L
PECAN/HICK TREE IGE QN: <0.1 KU/L
ROACH IGE QN: <0.1 KU/L
SALTWORT IGE QN: <0.1 KU/L
SCALLOP IGE QN: <0.1 KU/L
SESAME SEED IGE QN: <0.1 KU/L
SHEEP SORREL IGE QN: <0.1 KU/L
SHRIMP IGE QN: <0.1 KU/L
SILVER BIRCH IGE QN: <0.1 KU/L
SOYBEAN IGE QN: <0.1 KU/L
TIMOTHY IGE QN: <0.1 KU/L
TOTAL IGE SMQN RAST: 39.4 KU/L
WALNUT IGE QN: <0.1 KU/L
WHEAT IGE QN: <0.1 KU/L
WHITE ASH IGE QN: <0.1 KU/L
WHITE ELM IGE QN: <0.1 KU/L
WHITE MULBERRY IGE QN: <0.1 KU/L
WHITE OAK IGE QN: <0.1 KU/L

## 2024-07-05 LAB — GLUTEN IGG-MCNC: 8.92 MCG/ML

## 2024-07-06 LAB
B BURGDOR DNA SPEC QL NAA+PROBE: NOT DETECTED
SPECIMEN SOURCE: NORMAL

## 2024-07-09 ENCOUNTER — APPOINTMENT (OUTPATIENT)
Dept: PRIMARY CARE | Facility: CLINIC | Age: 34
End: 2024-07-09
Payer: COMMERCIAL

## 2024-08-02 ENCOUNTER — APPOINTMENT (OUTPATIENT)
Dept: OPHTHALMOLOGY | Facility: CLINIC | Age: 34
End: 2024-08-02
Payer: COMMERCIAL

## 2024-08-07 ENCOUNTER — APPOINTMENT (OUTPATIENT)
Dept: RHEUMATOLOGY | Facility: CLINIC | Age: 34
End: 2024-08-07
Payer: COMMERCIAL